# Patient Record
Sex: MALE | Race: WHITE | HISPANIC OR LATINO
[De-identification: names, ages, dates, MRNs, and addresses within clinical notes are randomized per-mention and may not be internally consistent; named-entity substitution may affect disease eponyms.]

---

## 2023-02-14 ENCOUNTER — NON-APPOINTMENT (OUTPATIENT)
Age: 28
End: 2023-02-14

## 2023-02-14 ENCOUNTER — APPOINTMENT (OUTPATIENT)
Dept: CARDIOLOGY | Facility: CLINIC | Age: 28
End: 2023-02-14
Payer: MEDICAID

## 2023-02-14 VITALS
HEIGHT: 70.5 IN | OXYGEN SATURATION: 98 % | SYSTOLIC BLOOD PRESSURE: 110 MMHG | DIASTOLIC BLOOD PRESSURE: 72 MMHG | HEART RATE: 80 BPM | BODY MASS INDEX: 23.93 KG/M2 | RESPIRATION RATE: 12 BRPM | WEIGHT: 169 LBS | TEMPERATURE: 98.2 F

## 2023-02-14 VITALS — SYSTOLIC BLOOD PRESSURE: 108 MMHG | DIASTOLIC BLOOD PRESSURE: 68 MMHG

## 2023-02-14 DIAGNOSIS — Z78.9 OTHER SPECIFIED HEALTH STATUS: ICD-10-CM

## 2023-02-14 DIAGNOSIS — R00.1 BRADYCARDIA, UNSPECIFIED: ICD-10-CM

## 2023-02-14 DIAGNOSIS — E55.9 VITAMIN D DEFICIENCY, UNSPECIFIED: ICD-10-CM

## 2023-02-14 PROCEDURE — 93000 ELECTROCARDIOGRAM COMPLETE: CPT

## 2023-02-14 PROCEDURE — 99205 OFFICE O/P NEW HI 60 MIN: CPT | Mod: 25

## 2023-02-14 RX ORDER — CHOLECALCIFEROL (VITAMIN D3) 1250 MCG
1.25 MG CAPSULE ORAL
Qty: 15 | Refills: 0 | Status: ACTIVE | COMMUNITY

## 2023-02-14 NOTE — DISCUSSION/SUMMARY
[Patient] : the patient [Risks] : risks [Benefits] : benefits [Alternatives] : alternatives [With Me] : with me [___ Month(s)] : in [unfilled] month(s) [FreeTextEntry1] : This is a 27 year old male with recent diagnosis of ADHD, 2 weeks ago , here for evaluation for bradycardia nad if can tolerate stimulants. \par \par \par 1) bradycardia:  unremarkable . no clinical significance. asymtomatic\par 2) cardiac monitoring for stimulants. : There is no cardaic contraindications to take stimulants.  No risk facotrs for side effects or intolerance to stimulants.  please proceed with the stimulants as indicated.  Counselling and patient provided information for monitoring and follow up. \par hydration. restric coffe use if needed. \par sooner follow up if symptoms. otherwasie 6 mths routine follow up.  [EKG obtained to assist in diagnosis and management of assessed problem(s)] : EKG obtained to assist in diagnosis and management of assessed problem(s)

## 2023-02-14 NOTE — HISTORY OF PRESENT ILLNESS
[FreeTextEntry1] : bradycardia and evaluation for taking stimulants. \par \par \par This is a 27 year old male with recent diagnosis of ADHD, 2 weeks ago , here for evaluation for bradycardia nad if can tolerate stimulants. \par he has been feeling good otherwise. \par no chest pain . no dyspnea on exertion ,. no dizziness. no syncope.  no palpitations.  \par no dyspnea on exertion . no chest pain on exertion.   \par \par he drinks coffee once a day.  \par \par No smoking. No alcohol. No drugs. \par \par Family history: \par Father:  no myocardial infarction. no Cerebro vascular accident \par Mother :  no myocardial infarction. No cerebrovascualr accident\par Siblings:  No myocardial infarction.  No CVA ; no Sudden cardiac death. \par

## 2023-03-08 ENCOUNTER — APPOINTMENT (OUTPATIENT)
Dept: CARDIOLOGY | Facility: CLINIC | Age: 28
End: 2023-03-08

## 2023-08-29 ENCOUNTER — APPOINTMENT (OUTPATIENT)
Dept: CARDIOLOGY | Facility: CLINIC | Age: 28
End: 2023-08-29